# Patient Record
Sex: FEMALE | Race: WHITE
[De-identification: names, ages, dates, MRNs, and addresses within clinical notes are randomized per-mention and may not be internally consistent; named-entity substitution may affect disease eponyms.]

---

## 2021-06-26 ENCOUNTER — HOSPITAL ENCOUNTER (EMERGENCY)
Dept: HOSPITAL 54 - ER | Age: 86
Discharge: HOME | End: 2021-06-26
Payer: MEDICARE

## 2021-06-26 VITALS — BODY MASS INDEX: 31.34 KG/M2 | WEIGHT: 166 LBS | HEIGHT: 61 IN

## 2021-06-26 VITALS — DIASTOLIC BLOOD PRESSURE: 82 MMHG | SYSTOLIC BLOOD PRESSURE: 143 MMHG

## 2021-06-26 DIAGNOSIS — I10: ICD-10-CM
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
## 2021-06-26 DIAGNOSIS — S52.592A: Primary | ICD-10-CM

## 2021-06-26 DIAGNOSIS — E78.00: ICD-10-CM
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
## 2022-10-26 ENCOUNTER — HOSPITAL ENCOUNTER (INPATIENT)
Dept: HOSPITAL 12 - ER | Age: 87
LOS: 1 days | Discharge: NURSING, REHAB OR LONG-TERM CARE | DRG: 640 | End: 2022-10-27
Payer: MEDICARE

## 2022-10-26 VITALS — BODY MASS INDEX: 25.61 KG/M2 | WEIGHT: 150 LBS | HEIGHT: 64 IN

## 2022-10-26 VITALS — SYSTOLIC BLOOD PRESSURE: 177 MMHG | DIASTOLIC BLOOD PRESSURE: 78 MMHG

## 2022-10-26 DIAGNOSIS — R53.1: ICD-10-CM

## 2022-10-26 DIAGNOSIS — D64.9: ICD-10-CM

## 2022-10-26 DIAGNOSIS — E86.0: Primary | ICD-10-CM
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
## 2022-10-26 DIAGNOSIS — I10: ICD-10-CM

## 2022-10-26 DIAGNOSIS — E78.5: ICD-10-CM

## 2022-10-26 DIAGNOSIS — F03.90: ICD-10-CM
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
## 2022-10-26 DIAGNOSIS — Z20.822: ICD-10-CM

## 2022-10-26 DIAGNOSIS — N17.0: ICD-10-CM

## 2022-10-26 LAB
BUN SERPL-MCNC: 24 MG/DL (ref 7–18)
CHLORIDE SERPL-SCNC: 102 MMOL/L (ref 98–107)
CO2 SERPL-SCNC: 27 MMOL/L (ref 21–32)
CREAT SERPL-MCNC: 1.6 MG/DL (ref 0.6–1.3)
GLUCOSE SERPL-MCNC: 96 MG/DL (ref 74–106)
HCT VFR BLD AUTO: 32 % (ref 31.2–41.9)
MAGNESIUM SERPL-MCNC: 2.2 MG/DL (ref 1.8–2.4)
MCH RBC QN AUTO: 32.2 UUG (ref 24.7–32.8)
MCV RBC AUTO: 97 FL (ref 75.5–95.3)
PLATELET # BLD AUTO: 292 K/UL (ref 179–408)
POTASSIUM SERPL-SCNC: 4.9 MMOL/L (ref 3.5–5.1)

## 2022-10-26 PROCEDURE — G0378 HOSPITAL OBSERVATION PER HR: HCPCS

## 2022-10-26 NOTE — NUR
Admitted to Telemetry with diagnosis of Syncope. AAO x 3. On room air. Noted a red bump on 
the back of the head. Denies any pain or discomfort. Ambulatory. Skin is intact. IV site on 
L AC 20 g intact and patent. Routine admission done. All needs attended. Call light within 
reach. Left bed in the lowest position. Safety precautions observed.

-------------------------------------------------------------------------------

Addendum: 10/27/22 at 0607 by ANIL THORPE RN

-------------------------------------------------------------------------------

SR on Tele during admission changed to Af[facility]. MD aware.

## 2022-10-26 NOTE — NUR
Pt. admitted to TELE room 316  , under care of Cassidy NP

Belongs List completed

Philomena MEYRS aware of patient's arrival

## 2022-10-27 VITALS — DIASTOLIC BLOOD PRESSURE: 69 MMHG | SYSTOLIC BLOOD PRESSURE: 144 MMHG

## 2022-10-27 VITALS — SYSTOLIC BLOOD PRESSURE: 135 MMHG | DIASTOLIC BLOOD PRESSURE: 66 MMHG

## 2022-10-27 VITALS — DIASTOLIC BLOOD PRESSURE: 58 MMHG | SYSTOLIC BLOOD PRESSURE: 107 MMHG

## 2022-10-27 VITALS — SYSTOLIC BLOOD PRESSURE: 176 MMHG | DIASTOLIC BLOOD PRESSURE: 64 MMHG

## 2022-10-27 LAB
BUN SERPL-MCNC: 18 MG/DL (ref 7–18)
CHLORIDE SERPL-SCNC: 108 MMOL/L (ref 98–107)
CHOLEST SERPL-MCNC: 239 MG/DL (ref ?–200)
CO2 SERPL-SCNC: 28 MMOL/L (ref 21–32)
CREAT SERPL-MCNC: 1.3 MG/DL (ref 0.6–1.3)
GLUCOSE SERPL-MCNC: 99 MG/DL (ref 74–106)
HCT VFR BLD AUTO: 31.9 % (ref 31.2–41.9)
HDLC SERPL-MCNC: 42 MG/DL (ref 40–60)
MAGNESIUM SERPL-MCNC: 2.2 MG/DL (ref 1.8–2.4)
MCH RBC QN AUTO: 32.3 UUG (ref 24.7–32.8)
MCV RBC AUTO: 96.9 FL (ref 75.5–95.3)
PHOSPHATE SERPL-MCNC: 3.1 MG/DL (ref 2.5–4.9)
PLATELET # BLD AUTO: 166 K/UL (ref 179–408)
POTASSIUM SERPL-SCNC: 4.2 MMOL/L (ref 3.5–5.1)
TRIGL SERPL-MCNC: 179 MG/DL (ref 30–150)

## 2022-10-27 NOTE — NUR
PT IS DISCHARGE. PT IS GOING TO [facility]. PT IS AMBULATORY. NO SOB OR DIZZINESS 
NOTED. PT IS  BY [facility]IA GURNEY. ALL BELONGINGS ACCOUNTED FOR. IV ACCESS 
REMOVE.

## 2022-10-27 NOTE — NUR
PT WILL BE DISCHARGE TODAY. FAMILY NOTIFIED. SON ASK FOR INFO REGARDING SNF/correction PLACEMENT, 
REFER SON TO [facility]. WILL F/U

## 2023-06-17 ENCOUNTER — HOSPITAL ENCOUNTER (EMERGENCY)
Dept: HOSPITAL 12 - ER | Age: 88
Discharge: HOME | End: 2023-06-17
Payer: MEDICARE

## 2023-06-17 VITALS — WEIGHT: 125 LBS | HEIGHT: 63 IN | BODY MASS INDEX: 22.15 KG/M2

## 2023-06-17 VITALS — DIASTOLIC BLOOD PRESSURE: 72 MMHG | SYSTOLIC BLOOD PRESSURE: 136 MMHG

## 2023-06-17 DIAGNOSIS — M62.838: ICD-10-CM

## 2023-06-17 DIAGNOSIS — N39.0: ICD-10-CM

## 2023-06-17 DIAGNOSIS — R00.1: ICD-10-CM

## 2023-06-17 DIAGNOSIS — R53.1: Primary | ICD-10-CM

## 2023-06-17 DIAGNOSIS — Z79.899: ICD-10-CM

## 2023-06-17 DIAGNOSIS — R07.89: ICD-10-CM

## 2023-06-17 DIAGNOSIS — R51.9: ICD-10-CM

## 2023-06-17 LAB
ALP SERPL-CCNC: 143 U/L (ref 50–136)
ALT SERPL W/O P-5'-P-CCNC: 42 U/L (ref 14–59)
APPEARANCE UR: CLEAR
AST SERPL-CCNC: 22 U/L (ref 15–37)
BILIRUB DIRECT SERPL-MCNC: 0.1 MG/DL (ref 0–0.2)
BILIRUB SERPL-MCNC: 0.2 MG/DL (ref 0.2–1)
BILIRUB UR QL STRIP: NEGATIVE
BUN SERPL-MCNC: 26 MG/DL (ref 7–18)
CHLORIDE SERPL-SCNC: 103 MMOL/L (ref 98–107)
CO2 SERPL-SCNC: 29 MMOL/L (ref 21–32)
COLOR UR: YELLOW
CREAT SERPL-MCNC: 1.4 MG/DL (ref 0.6–1.3)
DEPRECATED SQUAMOUS URNS QL MICRO: (no result) /HPF
GLUCOSE SERPL-MCNC: 135 MG/DL (ref 74–106)
GLUCOSE UR STRIP-MCNC: NEGATIVE MG/DL
HCT VFR BLD AUTO: 31.3 % (ref 31.2–41.9)
HGB UR QL STRIP: NEGATIVE
KETONES UR STRIP-MCNC: NEGATIVE MG/DL
LEUKOCYTE ESTERASE UR QL STRIP: (no result)
MAGNESIUM SERPL-MCNC: 2.2 MG/DL (ref 1.8–2.4)
MCH RBC QN AUTO: 31.1 UUG (ref 24.7–32.8)
MCV RBC AUTO: 96.4 FL (ref 75.5–95.3)
NITRITE UR QL STRIP: NEGATIVE
PH UR STRIP: 5.5 [PH] (ref 5–8)
PLATELET # BLD AUTO: 269 K/UL (ref 179–408)
POTASSIUM SERPL-SCNC: 4.3 MMOL/L (ref 3.5–5.1)
RBC #/AREA URNS HPF: (no result) /HPF (ref 0–3)
SP GR UR STRIP: 1.01 (ref 1–1.03)
UROBILINOGEN UR STRIP-MCNC: 0.2 E.U./DL
WBC #/AREA URNS HPF: (no result) /HPF
WS STN SPEC: 7.1 G/DL (ref 6.4–8.2)

## 2023-06-17 PROCEDURE — 96360 HYDRATION IV INFUSION INIT: CPT

## 2023-06-17 PROCEDURE — 36415 COLL VENOUS BLD VENIPUNCTURE: CPT

## 2023-06-17 PROCEDURE — 82607 VITAMIN B-12: CPT

## 2023-06-17 PROCEDURE — 93005 ELECTROCARDIOGRAM TRACING: CPT

## 2023-06-17 PROCEDURE — 80076 HEPATIC FUNCTION PANEL: CPT

## 2023-06-17 PROCEDURE — 99285 EMERGENCY DEPT VISIT HI MDM: CPT

## 2023-06-17 PROCEDURE — 71045 X-RAY EXAM CHEST 1 VIEW: CPT

## 2023-06-17 PROCEDURE — 83735 ASSAY OF MAGNESIUM: CPT

## 2023-06-17 PROCEDURE — 85025 COMPLETE CBC W/AUTO DIFF WBC: CPT

## 2023-06-17 PROCEDURE — 81001 URINALYSIS AUTO W/SCOPE: CPT

## 2023-06-17 PROCEDURE — A4663 DIALYSIS BLOOD PRESSURE CUFF: HCPCS

## 2023-06-17 PROCEDURE — 70450 CT HEAD/BRAIN W/O DYE: CPT

## 2023-06-17 PROCEDURE — 84484 ASSAY OF TROPONIN QUANT: CPT

## 2023-06-17 PROCEDURE — 72125 CT NECK SPINE W/O DYE: CPT

## 2023-06-17 PROCEDURE — 80048 BASIC METABOLIC PNL TOTAL CA: CPT

## 2023-06-17 PROCEDURE — 96361 HYDRATE IV INFUSION ADD-ON: CPT

## 2023-06-17 NOTE — NUR
Pt is noted in bed responsive but forgetful due to  a history off Dementia as 
report is received from the off going nurse that was brought in by son C/O Left 
Neck/Trapezius pain that started couples hours ago with no specipic Trauma. Pt 
care continue as awaits results.

## 2023-06-17 NOTE — NUR
Pt is noted off the unit as she is been discharge to home with son by her side 
as all discharge instructions given .

## 2023-08-15 ENCOUNTER — HOSPITAL ENCOUNTER (INPATIENT)
Dept: HOSPITAL 12 - ER | Age: 88
LOS: 3 days | Discharge: NURSING, REHAB OR LONG-TERM CARE | DRG: 73 | End: 2023-08-18
Attending: INTERNAL MEDICINE | Admitting: INTERNAL MEDICINE
Payer: MEDICARE

## 2023-08-15 VITALS — OXYGEN SATURATION: 95 % | SYSTOLIC BLOOD PRESSURE: 135 MMHG | DIASTOLIC BLOOD PRESSURE: 69 MMHG | TEMPERATURE: 98.5 F

## 2023-08-15 VITALS — OXYGEN SATURATION: 95 % | TEMPERATURE: 98.6 F | DIASTOLIC BLOOD PRESSURE: 59 MMHG | SYSTOLIC BLOOD PRESSURE: 118 MMHG

## 2023-08-15 VITALS — SYSTOLIC BLOOD PRESSURE: 164 MMHG | OXYGEN SATURATION: 99 % | TEMPERATURE: 99 F | DIASTOLIC BLOOD PRESSURE: 69 MMHG

## 2023-08-15 VITALS — SYSTOLIC BLOOD PRESSURE: 118 MMHG | TEMPERATURE: 98.6 F | OXYGEN SATURATION: 95 % | DIASTOLIC BLOOD PRESSURE: 59 MMHG

## 2023-08-15 VITALS — WEIGHT: 150.56 LBS | BODY MASS INDEX: 25.7 KG/M2 | HEIGHT: 64 IN

## 2023-08-15 DIAGNOSIS — K86.89: ICD-10-CM

## 2023-08-15 DIAGNOSIS — D68.59: ICD-10-CM

## 2023-08-15 DIAGNOSIS — Z87.891: ICD-10-CM

## 2023-08-15 DIAGNOSIS — N39.0: ICD-10-CM

## 2023-08-15 DIAGNOSIS — Z98.42: ICD-10-CM

## 2023-08-15 DIAGNOSIS — M89.8X9: ICD-10-CM

## 2023-08-15 DIAGNOSIS — I13.10: ICD-10-CM

## 2023-08-15 DIAGNOSIS — N83.202: ICD-10-CM

## 2023-08-15 DIAGNOSIS — D64.9: ICD-10-CM

## 2023-08-15 DIAGNOSIS — S01.312A: ICD-10-CM

## 2023-08-15 DIAGNOSIS — Z82.49: ICD-10-CM

## 2023-08-15 DIAGNOSIS — J44.9: ICD-10-CM

## 2023-08-15 DIAGNOSIS — F32.A: ICD-10-CM

## 2023-08-15 DIAGNOSIS — M50.30: ICD-10-CM

## 2023-08-15 DIAGNOSIS — Z74.09: ICD-10-CM

## 2023-08-15 DIAGNOSIS — E86.0: ICD-10-CM

## 2023-08-15 DIAGNOSIS — Z98.41: ICD-10-CM

## 2023-08-15 DIAGNOSIS — N17.0: ICD-10-CM

## 2023-08-15 DIAGNOSIS — K44.9: ICD-10-CM

## 2023-08-15 DIAGNOSIS — D75.89: ICD-10-CM

## 2023-08-15 DIAGNOSIS — K57.90: ICD-10-CM

## 2023-08-15 DIAGNOSIS — I25.10: ICD-10-CM

## 2023-08-15 DIAGNOSIS — I35.8: ICD-10-CM
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
## 2023-08-15 DIAGNOSIS — Z95.5: ICD-10-CM

## 2023-08-15 DIAGNOSIS — M10.9: ICD-10-CM

## 2023-08-15 DIAGNOSIS — Z90.49: ICD-10-CM

## 2023-08-15 DIAGNOSIS — Z90.710: ICD-10-CM

## 2023-08-15 DIAGNOSIS — N18.2: ICD-10-CM

## 2023-08-15 DIAGNOSIS — S20.211A: ICD-10-CM

## 2023-08-15 DIAGNOSIS — I70.0: ICD-10-CM

## 2023-08-15 DIAGNOSIS — R29.6: ICD-10-CM

## 2023-08-15 DIAGNOSIS — F01.53: ICD-10-CM

## 2023-08-15 DIAGNOSIS — Z79.899: ICD-10-CM

## 2023-08-15 DIAGNOSIS — I49.3: ICD-10-CM
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
## 2023-08-15 DIAGNOSIS — S09.90XA: ICD-10-CM

## 2023-08-15 DIAGNOSIS — G90.8: Primary | ICD-10-CM

## 2023-08-15 DIAGNOSIS — I70.8: ICD-10-CM

## 2023-08-15 LAB
ALBUMIN SERPL BCG-MCNC: 3.2 G/DL (ref 3.4–5)
ALP SERPL-CCNC: 73 U/L (ref 50–136)
ALT SERPL W/O P-5'-P-CCNC: 16 U/L (ref 14–59)
APPEARANCE UR: CLEAR
AST SERPL-CCNC: 14 U/L (ref 15–37)
BASOPHILS # BLD AUTO: 0.1 K/UL (ref 0–0.2)
BASOPHILS NFR BLD AUTO: 0.4 % (ref 0–2)
BILIRUB DIRECT SERPL-MCNC: 0.1 MG/DL (ref 0–0.2)
BILIRUB SERPL-MCNC: 0.4 MG/DL (ref 0.2–1)
BILIRUB UR QL STRIP: NEGATIVE
BUN SERPL-MCNC: 39 MG/DL (ref 7–18)
CALCIUM SERPL-MCNC: 9.1 MG/DL (ref 8.5–10.1)
CHLORIDE SERPL-SCNC: 104 MMOL/L (ref 98–107)
CO2 SERPL-SCNC: 28 MMOL/L (ref 21–32)
COLOR UR: YELLOW
CREAT SERPL-MCNC: 1.8 MG/DL (ref 0.6–1.3)
DEPRECATED SQUAMOUS URNS QL MICRO: (no result) /HPF
EOSINOPHIL # BLD AUTO: 0.1 K/UL (ref 0–0.7)
EOSINOPHIL NFR BLD AUTO: 0.5 % (ref 0–7)
ERYTHROCYTE [DISTWIDTH] IN BLOOD BY AUTOMATED COUNT: 15.9 % (ref 12.3–17.7)
GLUCOSE SERPL-MCNC: 118 MG/DL (ref 74–106)
GLUCOSE UR STRIP-MCNC: NEGATIVE MG/DL
HCT VFR BLD AUTO: 30 % (ref 31.2–41.9)
HGB BLD-MCNC: 9.6 G/DL (ref 10.9–14.3)
HGB UR QL STRIP: NEGATIVE
KETONES UR STRIP-MCNC: NEGATIVE MG/DL
LEUKOCYTE ESTERASE UR QL STRIP: (no result)
LYMPHOCYTES # BLD AUTO: 1.5 K/UL (ref 0.8–4.8)
LYMPHOCYTES NFR BLD AUTO: 11.8 % (ref 20.5–51.5)
MANUAL DIF COMMENT BLD-IMP: 1
MCH RBC QN AUTO: 30.9 UUG (ref 24.7–32.8)
MCHC RBC AUTO-ENTMCNC: 32 G/DL (ref 32.3–35.6)
MCV RBC AUTO: 96.1 FL (ref 75.5–95.3)
MONOCYTES # BLD AUTO: 0.7 K/UL (ref 0.1–1.3)
MONOCYTES NFR BLD AUTO: 5.3 % (ref 0–11)
NEUTROPHILS # BLD AUTO: 10.2 K/UL (ref 1.8–8.9)
NEUTROPHILS NFR BLD AUTO: 82 % (ref 38.5–71.5)
NITRITE UR QL STRIP: NEGATIVE
NT-PROBNP SERPL-MCNC: 2114 PG/ML (ref 0–125)
PH UR STRIP: 7 [PH] (ref 5–8)
PLATELET # BLD AUTO: 264 K/UL (ref 179–408)
POTASSIUM SERPL-SCNC: 4.6 MMOL/L (ref 3.5–5.1)
PROT UR QL STRIP: NEGATIVE
RBC # BLD AUTO: 3.12 MIL/UL (ref 3.63–4.92)
RBC #/AREA URNS HPF: (no result) /HPF (ref 0–3)
SODIUM SERPL-SCNC: 140 MMOL/L (ref 136–145)
SP GR UR STRIP: 1.01 (ref 1–1.03)
UROBILINOGEN UR STRIP-MCNC: 0.2 E.U./DL
WBC # BLD AUTO: 12.4 K/UL (ref 3.8–11.8)
WBC #/AREA URNS HPF: (no result) /HPF
WBC #/AREA URNS HPF: (no result) /HPF (ref 0–3)
WS STN SPEC: 7.4 G/DL (ref 6.4–8.2)

## 2023-08-15 PROCEDURE — A4663 DIALYSIS BLOOD PRESSURE CUFF: HCPCS

## 2023-08-15 PROCEDURE — G0378 HOSPITAL OBSERVATION PER HR: HCPCS

## 2023-08-15 PROCEDURE — 0HQ3XZZ REPAIR LEFT EAR SKIN, EXTERNAL APPROACH: ICD-10-PCS

## 2023-08-15 RX ADMIN — METOPROLOL SUCCINATE SCH MG: 50 TABLET, FILM COATED, EXTENDED RELEASE ORAL at 14:27

## 2023-08-15 RX ADMIN — Medication SCH MG: at 14:29

## 2023-08-15 RX ADMIN — ALLOPURINOL SCH MG: 100 TABLET ORAL at 14:30

## 2023-08-15 RX ADMIN — Medication PRN MG: at 22:52

## 2023-08-15 RX ADMIN — DOCUSATE SODIUM SCH MG: 100 CAPSULE, LIQUID FILLED ORAL at 20:17

## 2023-08-15 RX ADMIN — METOPROLOL SUCCINATE SCH MG: 50 TABLET, FILM COATED, EXTENDED RELEASE ORAL at 14:30

## 2023-08-15 RX ADMIN — Medication SCH MG: at 14:26

## 2023-08-15 RX ADMIN — ARIPIPRAZOLE SCH MG: 2 TABLET ORAL at 15:33

## 2023-08-15 RX ADMIN — DEXTROSE SCH MLS/HR: 50 INJECTION, SOLUTION INTRAVENOUS at 22:08

## 2023-08-15 RX ADMIN — ATORVASTATIN CALCIUM SCH MG: 20 TABLET, FILM COATED ORAL at 20:17

## 2023-08-15 RX ADMIN — ALLOPURINOL SCH MG: 100 TABLET ORAL at 14:28

## 2023-08-16 VITALS — DIASTOLIC BLOOD PRESSURE: 69 MMHG | TEMPERATURE: 97.8 F | SYSTOLIC BLOOD PRESSURE: 137 MMHG | OXYGEN SATURATION: 98 %

## 2023-08-16 VITALS — OXYGEN SATURATION: 97 % | DIASTOLIC BLOOD PRESSURE: 68 MMHG | SYSTOLIC BLOOD PRESSURE: 152 MMHG | TEMPERATURE: 98.9 F

## 2023-08-16 VITALS — SYSTOLIC BLOOD PRESSURE: 145 MMHG | DIASTOLIC BLOOD PRESSURE: 58 MMHG | OXYGEN SATURATION: 97 % | TEMPERATURE: 97.8 F

## 2023-08-16 VITALS — OXYGEN SATURATION: 95 % | SYSTOLIC BLOOD PRESSURE: 158 MMHG | DIASTOLIC BLOOD PRESSURE: 56 MMHG | TEMPERATURE: 98.4 F

## 2023-08-16 VITALS — TEMPERATURE: 97.8 F | OXYGEN SATURATION: 100 % | SYSTOLIC BLOOD PRESSURE: 144 MMHG | DIASTOLIC BLOOD PRESSURE: 55 MMHG

## 2023-08-16 VITALS — TEMPERATURE: 98.1 F | OXYGEN SATURATION: 97 % | DIASTOLIC BLOOD PRESSURE: 55 MMHG | SYSTOLIC BLOOD PRESSURE: 152 MMHG

## 2023-08-16 VITALS — OXYGEN SATURATION: 96 % | TEMPERATURE: 98.8 F | DIASTOLIC BLOOD PRESSURE: 72 MMHG | SYSTOLIC BLOOD PRESSURE: 145 MMHG

## 2023-08-16 LAB
ALBUMIN SERPL BCG-MCNC: 2.9 G/DL (ref 3.4–5)
ALP SERPL-CCNC: 66 U/L (ref 50–136)
ALT SERPL W/O P-5'-P-CCNC: 10 U/L (ref 14–59)
AST SERPL-CCNC: 15 U/L (ref 15–37)
BASOPHILS # BLD AUTO: 0 K/UL (ref 0–0.2)
BASOPHILS NFR BLD AUTO: 0.3 % (ref 0–2)
BILIRUB SERPL-MCNC: 0.4 MG/DL (ref 0.2–1)
BUN SERPL-MCNC: 33 MG/DL (ref 7–18)
CALCIUM SERPL-MCNC: 9.2 MG/DL (ref 8.5–10.1)
CHLORIDE SERPL-SCNC: 105 MMOL/L (ref 98–107)
CHOLEST SERPL-MCNC: 193 MG/DL (ref ?–200)
CK SERPL-CCNC: 77 U/L (ref 26–192)
CO2 SERPL-SCNC: 29 MMOL/L (ref 21–32)
CREAT SERPL-MCNC: 1.5 MG/DL (ref 0.6–1.3)
EOSINOPHIL # BLD AUTO: 0.2 K/UL (ref 0–0.7)
EOSINOPHIL NFR BLD AUTO: 1.9 % (ref 0–7)
ERYTHROCYTE [DISTWIDTH] IN BLOOD BY AUTOMATED COUNT: 15.6 % (ref 12.3–17.7)
GLUCOSE SERPL-MCNC: 99 MG/DL (ref 74–106)
HCT VFR BLD AUTO: 28.9 % (ref 31.2–41.9)
HDLC SERPL-MCNC: 38 MG/DL (ref 40–60)
HGB BLD-MCNC: 9.5 G/DL (ref 10.9–14.3)
IRON SERPL-MCNC: 34 UG/DL (ref 50–175)
LYMPHOCYTES # BLD AUTO: 2.7 K/UL (ref 0.8–4.8)
LYMPHOCYTES NFR BLD AUTO: 23.8 % (ref 20.5–51.5)
MAGNESIUM SERPL-MCNC: 2.5 MG/DL (ref 1.8–2.4)
MANUAL DIF COMMENT BLD-IMP: 1
MCH RBC QN AUTO: 31.3 UUG (ref 24.7–32.8)
MCHC RBC AUTO-ENTMCNC: 33 G/DL (ref 32.3–35.6)
MCV RBC AUTO: 95.1 FL (ref 75.5–95.3)
MONOCYTES # BLD AUTO: 0.7 K/UL (ref 0.1–1.3)
MONOCYTES NFR BLD AUTO: 6.5 % (ref 0–11)
NEUTROPHILS # BLD AUTO: 7.6 K/UL (ref 1.8–8.9)
NEUTROPHILS NFR BLD AUTO: 67.5 % (ref 38.5–71.5)
PHOSPHATE SERPL-MCNC: 3.1 MG/DL (ref 2.5–4.9)
PLATELET # BLD AUTO: 237 K/UL (ref 179–408)
POTASSIUM SERPL-SCNC: 4.4 MMOL/L (ref 3.5–5.1)
RBC # BLD AUTO: 3.04 MIL/UL (ref 3.63–4.92)
SODIUM SERPL-SCNC: 140 MMOL/L (ref 136–145)
TRIGL SERPL-MCNC: 107 MG/DL (ref 30–150)
TSH SERPL DL<=0.005 MIU/L-ACNC: 1.28 MIU/ML (ref 0.36–3.74)
WBC # BLD AUTO: 11.2 K/UL (ref 3.8–11.8)
WS STN SPEC: 6.6 G/DL (ref 6.4–8.2)

## 2023-08-16 RX ADMIN — Medication SCH MG: at 08:58

## 2023-08-16 RX ADMIN — ATORVASTATIN CALCIUM SCH MG: 20 TABLET, FILM COATED ORAL at 21:07

## 2023-08-16 RX ADMIN — DOCUSATE SODIUM SCH MG: 100 CAPSULE, LIQUID FILLED ORAL at 21:07

## 2023-08-16 RX ADMIN — Medication PRN MG: at 21:07

## 2023-08-16 RX ADMIN — METOPROLOL SUCCINATE SCH MG: 50 TABLET, FILM COATED, EXTENDED RELEASE ORAL at 08:59

## 2023-08-16 RX ADMIN — ALLOPURINOL SCH MG: 100 TABLET ORAL at 08:58

## 2023-08-16 RX ADMIN — ARIPIPRAZOLE SCH MG: 2 TABLET ORAL at 08:58

## 2023-08-16 RX ADMIN — DEXTROSE SCH MLS/HR: 50 INJECTION, SOLUTION INTRAVENOUS at 21:07

## 2023-08-16 RX ADMIN — ASPIRIN SCH MG: 81 TABLET, COATED ORAL at 08:59

## 2023-08-16 RX ADMIN — PANTOPRAZOLE SODIUM SCH MG: 40 TABLET, DELAYED RELEASE ORAL at 06:16

## 2023-08-16 RX ADMIN — PANTOPRAZOLE SODIUM SCH MG: 40 TABLET, DELAYED RELEASE ORAL at 06:30

## 2023-08-17 VITALS — OXYGEN SATURATION: 96 % | DIASTOLIC BLOOD PRESSURE: 50 MMHG | SYSTOLIC BLOOD PRESSURE: 134 MMHG | TEMPERATURE: 98 F

## 2023-08-17 VITALS — SYSTOLIC BLOOD PRESSURE: 126 MMHG | TEMPERATURE: 98.2 F | DIASTOLIC BLOOD PRESSURE: 54 MMHG | OXYGEN SATURATION: 95 %

## 2023-08-17 VITALS — TEMPERATURE: 97.8 F | SYSTOLIC BLOOD PRESSURE: 131 MMHG | OXYGEN SATURATION: 96 % | DIASTOLIC BLOOD PRESSURE: 52 MMHG

## 2023-08-17 VITALS — SYSTOLIC BLOOD PRESSURE: 136 MMHG | DIASTOLIC BLOOD PRESSURE: 62 MMHG | TEMPERATURE: 97.9 F | OXYGEN SATURATION: 94 %

## 2023-08-17 VITALS — TEMPERATURE: 98.4 F | DIASTOLIC BLOOD PRESSURE: 53 MMHG | SYSTOLIC BLOOD PRESSURE: 125 MMHG | OXYGEN SATURATION: 94 %

## 2023-08-17 LAB
ALBUMIN SERPL BCG-MCNC: 2.7 G/DL (ref 3.4–5)
ALP SERPL-CCNC: 70 U/L (ref 50–136)
ALT SERPL W/O P-5'-P-CCNC: 8 U/L (ref 14–59)
APPEARANCE UR: CLEAR
AST SERPL-CCNC: 9 U/L (ref 15–37)
BASOPHILS # BLD AUTO: 0 K/UL (ref 0–0.2)
BASOPHILS NFR BLD AUTO: 0.3 % (ref 0–2)
BILIRUB SERPL-MCNC: 0.3 MG/DL (ref 0.2–1)
BILIRUB UR QL STRIP: NEGATIVE
BUN SERPL-MCNC: 20 MG/DL (ref 7–18)
CALCIUM SERPL-MCNC: 8.4 MG/DL (ref 8.5–10.1)
CHLORIDE SERPL-SCNC: 105 MMOL/L (ref 98–107)
CO2 SERPL-SCNC: 24 MMOL/L (ref 21–32)
COLOR UR: YELLOW
CREAT SERPL-MCNC: 1.4 MG/DL (ref 0.6–1.3)
CREAT UR-MCNC: 62.3 MG/DL (ref 30–125)
DEPRECATED SQUAMOUS URNS QL MICRO: (no result) /HPF
EOSINOPHIL # BLD AUTO: 0.2 K/UL (ref 0–0.7)
EOSINOPHIL NFR BLD AUTO: 1.6 % (ref 0–7)
ERYTHROCYTE [DISTWIDTH] IN BLOOD BY AUTOMATED COUNT: 15.3 % (ref 12.3–17.7)
GLUCOSE SERPL-MCNC: 108 MG/DL (ref 74–106)
GLUCOSE UR STRIP-MCNC: NEGATIVE MG/DL
HCT VFR BLD AUTO: 28.8 % (ref 31.2–41.9)
HGB BLD-MCNC: 9.4 G/DL (ref 10.9–14.3)
HGB UR QL STRIP: NEGATIVE
KETONES UR STRIP-MCNC: NEGATIVE MG/DL
LEUKOCYTE ESTERASE UR QL STRIP: (no result)
LYMPHOCYTES # BLD AUTO: 2.4 K/UL (ref 0.8–4.8)
LYMPHOCYTES NFR BLD AUTO: 17.4 % (ref 20.5–51.5)
MAGNESIUM SERPL-MCNC: 2.2 MG/DL (ref 1.8–2.4)
MANUAL DIF COMMENT BLD-IMP: 1
MCH RBC QN AUTO: 31.2 UUG (ref 24.7–32.8)
MCHC RBC AUTO-ENTMCNC: 33 G/DL (ref 32.3–35.6)
MCV RBC AUTO: 95.8 FL (ref 75.5–95.3)
MONOCYTES # BLD AUTO: 0.9 K/UL (ref 0.1–1.3)
MONOCYTES NFR BLD AUTO: 6.6 % (ref 0–11)
NEUTROPHILS # BLD AUTO: 10.1 K/UL (ref 1.8–8.9)
NEUTROPHILS NFR BLD AUTO: 74.1 % (ref 38.5–71.5)
NITRITE UR QL STRIP: NEGATIVE
PH UR STRIP: 6 [PH] (ref 5–8)
PHOSPHATE SERPL-MCNC: 2.7 MG/DL (ref 2.5–4.9)
PLATELET # BLD AUTO: 221 K/UL (ref 179–408)
POTASSIUM SERPL-SCNC: 4.1 MMOL/L (ref 3.5–5.1)
PROT UR QL STRIP: NEGATIVE
PROT UR-MCNC: 14.5 MG/DL
RBC # BLD AUTO: 3.01 MIL/UL (ref 3.63–4.92)
SODIUM SERPL-SCNC: 137 MMOL/L (ref 136–145)
SODIUM UR-SCNC: 102 MMOL/L (ref 40–220)
SP GR UR STRIP: 1.01 (ref 1–1.03)
UROBILINOGEN UR STRIP-MCNC: 0.2 E.U./DL
WBC # BLD AUTO: 13.6 K/UL (ref 3.8–11.8)
WBC #/AREA URNS HPF: (no result) /HPF
WBC #/AREA URNS HPF: (no result) /HPF (ref 0–3)
WS STN SPEC: 6.7 G/DL (ref 6.4–8.2)

## 2023-08-17 RX ADMIN — PANTOPRAZOLE SODIUM SCH MG: 40 TABLET, DELAYED RELEASE ORAL at 06:05

## 2023-08-17 RX ADMIN — ARIPIPRAZOLE SCH MG: 2 TABLET ORAL at 08:42

## 2023-08-17 RX ADMIN — Medication SCH MG: at 08:41

## 2023-08-17 RX ADMIN — ACETAMINOPHEN PRN MG: 325 TABLET ORAL at 12:52

## 2023-08-17 RX ADMIN — DEXTROSE SCH MLS/HR: 50 INJECTION, SOLUTION INTRAVENOUS at 21:46

## 2023-08-17 RX ADMIN — DOCUSATE SODIUM SCH MG: 100 CAPSULE, LIQUID FILLED ORAL at 21:46

## 2023-08-17 RX ADMIN — ATORVASTATIN CALCIUM SCH MG: 20 TABLET, FILM COATED ORAL at 21:45

## 2023-08-17 RX ADMIN — METOPROLOL SUCCINATE SCH MG: 50 TABLET, FILM COATED, EXTENDED RELEASE ORAL at 08:41

## 2023-08-17 RX ADMIN — ALLOPURINOL SCH MG: 100 TABLET ORAL at 08:41

## 2023-08-17 RX ADMIN — ASPIRIN SCH MG: 81 TABLET, COATED ORAL at 08:41

## 2023-08-18 VITALS — DIASTOLIC BLOOD PRESSURE: 61 MMHG | TEMPERATURE: 98.2 F | OXYGEN SATURATION: 96 % | SYSTOLIC BLOOD PRESSURE: 150 MMHG

## 2023-08-18 VITALS — DIASTOLIC BLOOD PRESSURE: 82 MMHG | SYSTOLIC BLOOD PRESSURE: 191 MMHG | OXYGEN SATURATION: 96 % | TEMPERATURE: 95 F

## 2023-08-18 VITALS — OXYGEN SATURATION: 98 %

## 2023-08-18 VITALS — SYSTOLIC BLOOD PRESSURE: 150 MMHG | TEMPERATURE: 98 F | DIASTOLIC BLOOD PRESSURE: 62 MMHG | OXYGEN SATURATION: 99 %

## 2023-08-18 VITALS — DIASTOLIC BLOOD PRESSURE: 43 MMHG | SYSTOLIC BLOOD PRESSURE: 129 MMHG

## 2023-08-18 LAB
BASOPHILS # BLD AUTO: 0.1 K/UL (ref 0–0.2)
BASOPHILS NFR BLD AUTO: 0.9 % (ref 0–2)
BUN SERPL-MCNC: 19 MG/DL (ref 7–18)
CALCIUM SERPL-MCNC: 8.7 MG/DL (ref 8.5–10.1)
CHLORIDE SERPL-SCNC: 105 MMOL/L (ref 98–107)
CO2 SERPL-SCNC: 26 MMOL/L (ref 21–32)
CREAT SERPL-MCNC: 1.6 MG/DL (ref 0.6–1.3)
EOSINOPHIL # BLD AUTO: 0.3 K/UL (ref 0–0.7)
EOSINOPHIL NFR BLD AUTO: 2.9 % (ref 0–7)
ERYTHROCYTE [DISTWIDTH] IN BLOOD BY AUTOMATED COUNT: 15.6 % (ref 12.3–17.7)
GLUCOSE SERPL-MCNC: 96 MG/DL (ref 74–106)
HCT VFR BLD AUTO: 29.3 % (ref 31.2–41.9)
HGB BLD-MCNC: 9.6 G/DL (ref 10.9–14.3)
LYMPHOCYTES # BLD AUTO: 2.8 K/UL (ref 0.8–4.8)
LYMPHOCYTES NFR BLD AUTO: 26.6 % (ref 20.5–51.5)
MAGNESIUM SERPL-MCNC: 2.1 MG/DL (ref 1.8–2.4)
MANUAL DIF COMMENT BLD-IMP: 1
MCH RBC QN AUTO: 31.4 UUG (ref 24.7–32.8)
MCHC RBC AUTO-ENTMCNC: 33 G/DL (ref 32.3–35.6)
MCV RBC AUTO: 96 FL (ref 75.5–95.3)
MONOCYTES # BLD AUTO: 0.6 K/UL (ref 0.1–1.3)
MONOCYTES NFR BLD AUTO: 6 % (ref 0–11)
NEUTROPHILS # BLD AUTO: 6.8 K/UL (ref 1.8–8.9)
NEUTROPHILS NFR BLD AUTO: 63.6 % (ref 38.5–71.5)
PHOSPHATE SERPL-MCNC: 3.1 MG/DL (ref 2.5–4.9)
PLATELET # BLD AUTO: 264 K/UL (ref 179–408)
POTASSIUM SERPL-SCNC: 3.5 MMOL/L (ref 3.5–5.1)
RBC # BLD AUTO: 3.05 MIL/UL (ref 3.63–4.92)
SODIUM SERPL-SCNC: 139 MMOL/L (ref 136–145)
WBC # BLD AUTO: 10.7 K/UL (ref 3.8–11.8)

## 2023-08-18 RX ADMIN — ASPIRIN SCH MG: 81 TABLET, COATED ORAL at 08:51

## 2023-08-18 RX ADMIN — ALLOPURINOL SCH MG: 100 TABLET ORAL at 08:50

## 2023-08-18 RX ADMIN — METOPROLOL SUCCINATE SCH MG: 50 TABLET, FILM COATED, EXTENDED RELEASE ORAL at 08:51

## 2023-08-18 RX ADMIN — ARIPIPRAZOLE SCH MG: 2 TABLET ORAL at 08:50

## 2023-08-18 RX ADMIN — Medication SCH MG: at 08:50

## 2023-08-18 RX ADMIN — PANTOPRAZOLE SODIUM SCH MG: 40 TABLET, DELAYED RELEASE ORAL at 06:51

## 2023-08-18 RX ADMIN — ACETAMINOPHEN PRN MG: 325 TABLET ORAL at 10:58

## 2023-09-05 ENCOUNTER — HOSPITAL ENCOUNTER (EMERGENCY)
Dept: HOSPITAL 54 - ER | Age: 88
Discharge: HOME | End: 2023-09-05
Payer: MEDICARE

## 2023-09-05 VITALS — OXYGEN SATURATION: 96 % | SYSTOLIC BLOOD PRESSURE: 130 MMHG | TEMPERATURE: 98.7 F | DIASTOLIC BLOOD PRESSURE: 60 MMHG

## 2023-09-05 VITALS — WEIGHT: 151 LBS | HEIGHT: 61 IN | BODY MASS INDEX: 28.51 KG/M2

## 2023-09-05 DIAGNOSIS — R00.2: Primary | ICD-10-CM

## 2023-09-05 DIAGNOSIS — E78.00: ICD-10-CM

## 2023-09-05 DIAGNOSIS — K21.9: ICD-10-CM

## 2023-09-05 DIAGNOSIS — Z79.899: ICD-10-CM

## 2023-09-05 DIAGNOSIS — I10: ICD-10-CM

## 2023-09-05 LAB
BASOPHILS # BLD AUTO: 0 K/UL (ref 0–0.2)
BASOPHILS NFR BLD AUTO: 0.3 % (ref 0–2)
BUN SERPL-MCNC: 25 MG/DL (ref 7–18)
CALCIUM SERPL-MCNC: 9.7 MG/DL (ref 8.5–10.1)
CHLORIDE SERPL-SCNC: 101 MMOL/L (ref 98–107)
CO2 SERPL-SCNC: 21 MMOL/L (ref 21–32)
CREAT SERPL-MCNC: 2.1 MG/DL (ref 0.6–1.3)
EOSINOPHIL # BLD AUTO: 0 K/UL (ref 0–0.7)
EOSINOPHIL NFR BLD AUTO: 0.1 % (ref 0–6)
ERYTHROCYTE [DISTWIDTH] IN BLOOD BY AUTOMATED COUNT: 16.1 % (ref 11.5–15)
GLUCOSE SERPL-MCNC: 186 MG/DL (ref 74–106)
HCT VFR BLD AUTO: 30 % (ref 33–45)
HGB BLD-MCNC: 9.5 G/DL (ref 11.5–14.8)
LYMPHOCYTES NFR BLD AUTO: 1.9 K/UL (ref 0.8–4.8)
LYMPHOCYTES NFR BLD AUTO: 16 % (ref 20–44)
MAGNESIUM SERPL-MCNC: 2.2 MG/DL (ref 1.8–2.4)
MCH RBC QN AUTO: 31 PG (ref 26–33)
MCHC RBC AUTO-ENTMCNC: 32 G/DL (ref 31–36)
MCV RBC AUTO: 96 FL (ref 82–100)
MONOCYTES NFR BLD AUTO: 0.2 K/UL (ref 0.1–1.3)
MONOCYTES NFR BLD AUTO: 2 % (ref 2–12)
NEUTROPHILS # BLD AUTO: 9.5 K/UL (ref 1.8–8.9)
NEUTROPHILS NFR BLD AUTO: 81.6 % (ref 43–81)
NT-PROBNP SERPL-MCNC: 1427 PG/ML (ref 0–125)
PLATELET # BLD AUTO: 341 K/UL (ref 150–450)
POTASSIUM SERPL-SCNC: 4 MMOL/L (ref 3.5–5.1)
RBC # BLD AUTO: 3.08 MIL/UL (ref 4–5.2)
SODIUM SERPL-SCNC: 135 MMOL/L (ref 136–145)
TSH SERPL DL<=0.005 MIU/L-ACNC: 1.05 UIU/ML (ref 0.36–3.74)
WBC NRBC COR # BLD AUTO: 11.7 K/UL (ref 4.3–11)

## 2023-09-05 PROCEDURE — 83735 ASSAY OF MAGNESIUM: CPT

## 2023-09-05 PROCEDURE — 84439 ASSAY OF FREE THYROXINE: CPT

## 2023-09-05 PROCEDURE — 99285 EMERGENCY DEPT VISIT HI MDM: CPT

## 2023-09-05 PROCEDURE — 71045 X-RAY EXAM CHEST 1 VIEW: CPT

## 2023-09-05 PROCEDURE — 84443 ASSAY THYROID STIM HORMONE: CPT

## 2023-09-05 PROCEDURE — 93005 ELECTROCARDIOGRAM TRACING: CPT

## 2023-09-05 PROCEDURE — 83880 ASSAY OF NATRIURETIC PEPTIDE: CPT

## 2023-09-05 PROCEDURE — 96360 HYDRATION IV INFUSION INIT: CPT

## 2023-09-05 PROCEDURE — 84484 ASSAY OF TROPONIN QUANT: CPT

## 2023-09-05 PROCEDURE — 85025 COMPLETE CBC W/AUTO DIFF WBC: CPT

## 2023-09-05 PROCEDURE — 36415 COLL VENOUS BLD VENIPUNCTURE: CPT

## 2023-09-05 PROCEDURE — 80048 BASIC METABOLIC PNL TOTAL CA: CPT

## 2023-12-11 ENCOUNTER — HOSPITAL ENCOUNTER (INPATIENT)
Dept: HOSPITAL 54 - ER | Age: 88
LOS: 3 days | Discharge: NURSING, REHAB OR LONG-TERM CARE | DRG: 556 | End: 2023-12-14
Attending: INTERNAL MEDICINE | Admitting: INTERNAL MEDICINE
Payer: MEDICARE

## 2023-12-11 VITALS — TEMPERATURE: 98.8 F | OXYGEN SATURATION: 95 % | SYSTOLIC BLOOD PRESSURE: 123 MMHG | DIASTOLIC BLOOD PRESSURE: 93 MMHG

## 2023-12-11 VITALS — DIASTOLIC BLOOD PRESSURE: 96 MMHG | TEMPERATURE: 98.4 F | SYSTOLIC BLOOD PRESSURE: 151 MMHG | OXYGEN SATURATION: 97 %

## 2023-12-11 VITALS — DIASTOLIC BLOOD PRESSURE: 61 MMHG | OXYGEN SATURATION: 97 % | SYSTOLIC BLOOD PRESSURE: 159 MMHG | TEMPERATURE: 98.6 F

## 2023-12-11 VITALS — WEIGHT: 150 LBS | HEIGHT: 64 IN | BODY MASS INDEX: 25.61 KG/M2
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
## 2023-12-11 DIAGNOSIS — M25.551: Primary | ICD-10-CM

## 2023-12-11 DIAGNOSIS — Z90.49: ICD-10-CM

## 2023-12-11 DIAGNOSIS — F03.93: ICD-10-CM

## 2023-12-11 DIAGNOSIS — N83.202: ICD-10-CM
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
## 2023-12-11 DIAGNOSIS — N18.9: ICD-10-CM

## 2023-12-11 DIAGNOSIS — M10.9: ICD-10-CM

## 2023-12-11 DIAGNOSIS — F17.200: ICD-10-CM

## 2023-12-11 DIAGNOSIS — F32.A: ICD-10-CM

## 2023-12-11 DIAGNOSIS — R29.6: ICD-10-CM

## 2023-12-11 DIAGNOSIS — I12.9: ICD-10-CM

## 2023-12-11 DIAGNOSIS — Z53.20: ICD-10-CM

## 2023-12-11 DIAGNOSIS — E87.1: ICD-10-CM

## 2023-12-11 DIAGNOSIS — I10: ICD-10-CM

## 2023-12-11 DIAGNOSIS — K21.9: ICD-10-CM

## 2023-12-11 DIAGNOSIS — Z79.899: ICD-10-CM

## 2023-12-11 DIAGNOSIS — E78.00: ICD-10-CM

## 2023-12-11 DIAGNOSIS — E66.9: ICD-10-CM

## 2023-12-11 DIAGNOSIS — K57.30: ICD-10-CM

## 2023-12-11 DIAGNOSIS — E88.9: ICD-10-CM

## 2023-12-11 DIAGNOSIS — D64.9: ICD-10-CM

## 2023-12-11 LAB
ALBUMIN SERPL BCP-MCNC: 3.3 G/DL (ref 3.4–5)
ALP SERPL-CCNC: 95 U/L (ref 46–116)
ALT SERPL W P-5'-P-CCNC: 22 U/L (ref 12–78)
APTT PPP: 28.8 SEC (ref 24.3–34.3)
AST SERPL W P-5'-P-CCNC: 24 U/L (ref 15–37)
BASOPHILS # BLD AUTO: 0.1 K/UL (ref 0–0.2)
BASOPHILS NFR BLD AUTO: 0.8 % (ref 0–2)
BILIRUB DIRECT SERPL-MCNC: 0.1 MG/DL (ref 0–0.2)
BILIRUB SERPL-MCNC: 0.3 MG/DL (ref 0.2–1)
BUN SERPL-MCNC: 21 MG/DL (ref 7–18)
CALCIUM SERPL-MCNC: 9.5 MG/DL (ref 8.5–10.1)
CHLORIDE SERPL-SCNC: 100 MMOL/L (ref 98–107)
CO2 SERPL-SCNC: 27 MMOL/L (ref 21–32)
CREAT SERPL-MCNC: 1.7 MG/DL (ref 0.6–1.3)
EOSINOPHIL # BLD AUTO: 0.1 K/UL (ref 0–0.7)
EOSINOPHIL NFR BLD AUTO: 1.4 % (ref 0–6)
ERYTHROCYTE [DISTWIDTH] IN BLOOD BY AUTOMATED COUNT: 15.6 % (ref 11.5–15)
GLUCOSE SERPL-MCNC: 124 MG/DL (ref 74–106)
HCT VFR BLD AUTO: 30 % (ref 33–45)
HGB BLD-MCNC: 9.6 G/DL (ref 11.5–14.8)
INR PPP: 1.05 (ref 0.91–1.1)
LACTATE SERPL-SCNC: 1.6 MMOL/L (ref 0.4–2)
LYMPHOCYTES NFR BLD AUTO: 2.2 K/UL (ref 0.8–4.8)
LYMPHOCYTES NFR BLD AUTO: 25.2 % (ref 20–44)
MCH RBC QN AUTO: 31 PG (ref 26–33)
MCHC RBC AUTO-ENTMCNC: 33 G/DL (ref 31–36)
MCV RBC AUTO: 95 FL (ref 82–100)
MONOCYTES NFR BLD AUTO: 0.6 K/UL (ref 0.1–1.3)
MONOCYTES NFR BLD AUTO: 6.6 % (ref 2–12)
NEUTROPHILS # BLD AUTO: 5.8 K/UL (ref 1.8–8.9)
NEUTROPHILS NFR BLD AUTO: 66 % (ref 43–81)
PLATELET # BLD AUTO: 293 K/UL (ref 150–450)
POTASSIUM SERPL-SCNC: 4.2 MMOL/L (ref 3.5–5.1)
PROT SERPL-MCNC: 8.1 G/DL (ref 6.4–8.2)
PROTHROMBIN TIME: 11.1 SECS (ref 9.2–11.1)
RBC # BLD AUTO: 3.12 MIL/UL (ref 4–5.2)
SODIUM SERPL-SCNC: 135 MMOL/L (ref 136–145)
WBC NRBC COR # BLD AUTO: 8.8 K/UL (ref 4.3–11)

## 2023-12-11 PROCEDURE — G0378 HOSPITAL OBSERVATION PER HR: HCPCS

## 2023-12-11 PROCEDURE — A4223 INFUSION SUPPLIES W/O PUMP: HCPCS

## 2023-12-11 RX ADMIN — SODIUM CHLORIDE PRN MLS/HR: 4.5 INJECTION, SOLUTION INTRAVENOUS at 15:36

## 2023-12-12 VITALS — DIASTOLIC BLOOD PRESSURE: 54 MMHG | OXYGEN SATURATION: 97 % | SYSTOLIC BLOOD PRESSURE: 149 MMHG | TEMPERATURE: 99.1 F

## 2023-12-12 VITALS — DIASTOLIC BLOOD PRESSURE: 74 MMHG | OXYGEN SATURATION: 97 % | SYSTOLIC BLOOD PRESSURE: 141 MMHG

## 2023-12-12 VITALS — OXYGEN SATURATION: 98 % | TEMPERATURE: 98.6 F | SYSTOLIC BLOOD PRESSURE: 152 MMHG | DIASTOLIC BLOOD PRESSURE: 60 MMHG

## 2023-12-12 VITALS — DIASTOLIC BLOOD PRESSURE: 87 MMHG | TEMPERATURE: 98.6 F | SYSTOLIC BLOOD PRESSURE: 135 MMHG | OXYGEN SATURATION: 98 %

## 2023-12-12 VITALS — DIASTOLIC BLOOD PRESSURE: 79 MMHG | OXYGEN SATURATION: 97 % | SYSTOLIC BLOOD PRESSURE: 142 MMHG

## 2023-12-12 VITALS — OXYGEN SATURATION: 97 % | SYSTOLIC BLOOD PRESSURE: 135 MMHG | DIASTOLIC BLOOD PRESSURE: 57 MMHG

## 2023-12-12 LAB
APPEARANCE UR: CLEAR
BASOPHILS # BLD AUTO: 0 K/UL (ref 0–0.2)
BASOPHILS NFR BLD AUTO: 0.5 % (ref 0–2)
BILIRUB UR QL STRIP: NEGATIVE
BUN SERPL-MCNC: 23 MG/DL (ref 7–18)
CALCIUM SERPL-MCNC: 8.9 MG/DL (ref 8.5–10.1)
CHLORIDE SERPL-SCNC: 98 MMOL/L (ref 98–107)
CHOLEST SERPL-MCNC: 157 MG/DL (ref ?–200)
CO2 SERPL-SCNC: 27 MMOL/L (ref 21–32)
COLOR UR: YELLOW
CREAT SERPL-MCNC: 1.7 MG/DL (ref 0.6–1.3)
CREAT UR-MCNC: 24.9 MG/DL (ref 30–125)
EOSINOPHIL # BLD AUTO: 0.3 K/UL (ref 0–0.7)
EOSINOPHIL NFR BLD AUTO: 3.3 % (ref 0–6)
ERYTHROCYTE [DISTWIDTH] IN BLOOD BY AUTOMATED COUNT: 15.2 % (ref 11.5–15)
GLUCOSE SERPL-MCNC: 94 MG/DL (ref 74–106)
GLUCOSE UR STRIP-MCNC: NEGATIVE MG/DL
HCT VFR BLD AUTO: 24 % (ref 33–45)
HDLC SERPL-MCNC: 36 MG/DL (ref 40–60)
HGB BLD-MCNC: 8 G/DL (ref 11.5–14.8)
HGB UR QL STRIP: NEGATIVE ERY/UL
IRON SERPL-MCNC: 18 UG/DL (ref 50–175)
KETONES UR STRIP-MCNC: NEGATIVE MG/DL
LDLC SERPL DIRECT ASSAY-MCNC: 95 MG/DL (ref 0–99)
LEUKOCYTE ESTERASE UR QL STRIP: NEGATIVE
LYMPHOCYTES NFR BLD AUTO: 3.2 K/UL (ref 0.8–4.8)
LYMPHOCYTES NFR BLD AUTO: 37 % (ref 20–44)
MAGNESIUM SERPL-MCNC: 2.1 MG/DL (ref 1.8–2.4)
MCH RBC QN AUTO: 31 PG (ref 26–33)
MCHC RBC AUTO-ENTMCNC: 33 G/DL (ref 31–36)
MCV RBC AUTO: 94 FL (ref 82–100)
MONOCYTES NFR BLD AUTO: 0.8 K/UL (ref 0.1–1.3)
MONOCYTES NFR BLD AUTO: 8.8 % (ref 2–12)
NEUTROPHILS # BLD AUTO: 4.4 K/UL (ref 1.8–8.9)
NEUTROPHILS NFR BLD AUTO: 50.4 % (ref 43–81)
NITRITE UR QL STRIP: NEGATIVE
PH UR STRIP: 6 [PH] (ref 5–8)
PHOSPHATE SERPL-MCNC: 3.1 MG/DL (ref 2.5–4.9)
PLATELET # BLD AUTO: 242 K/UL (ref 150–450)
POTASSIUM SERPL-SCNC: 4.1 MMOL/L (ref 3.5–5.1)
PROT UR QL STRIP: NEGATIVE MG/DL
PROT UR-MCNC: 15.8 MG/DL (ref 0–11.9)
RBC # BLD AUTO: 2.58 MIL/UL (ref 4–5.2)
SODIUM SERPL-SCNC: 132 MMOL/L (ref 136–145)
SODIUM UR-SCNC: 89 MMOL/L (ref 40–220)
SP GR UR STRIP: 1.01 (ref 1–1.03)
TIBC SERPL-MCNC: 203 UG/DL (ref 250–450)
TRIGL SERPL-MCNC: 90 MG/DL (ref 30–150)
TSH SERPL DL<=0.005 MIU/L-ACNC: 1.55 UIU/ML (ref 0.36–3.74)
UROBILINOGEN UR STRIP-MCNC: 0.2 EU/DL
WBC NRBC COR # BLD AUTO: 8.7 K/UL (ref 4.3–11)

## 2023-12-12 RX ADMIN — PANTOPRAZOLE SODIUM SCH MG: 40 TABLET, DELAYED RELEASE ORAL at 07:39

## 2023-12-12 RX ADMIN — ARIPIPRAZOLE SCH MG: 2 TABLET ORAL at 08:36

## 2023-12-12 RX ADMIN — SODIUM CHLORIDE PRN MLS/HR: 4.5 INJECTION, SOLUTION INTRAVENOUS at 04:54

## 2023-12-12 RX ADMIN — METOPROLOL SUCCINATE SCH MG: 50 TABLET, EXTENDED RELEASE ORAL at 08:37

## 2023-12-12 RX ADMIN — FLUOXETINE HYDROCHLORIDE SCH MG: 20 CAPSULE ORAL at 08:37

## 2023-12-13 VITALS — OXYGEN SATURATION: 99 % | SYSTOLIC BLOOD PRESSURE: 148 MMHG | DIASTOLIC BLOOD PRESSURE: 87 MMHG | TEMPERATURE: 97.5 F

## 2023-12-13 VITALS — DIASTOLIC BLOOD PRESSURE: 61 MMHG | SYSTOLIC BLOOD PRESSURE: 143 MMHG | TEMPERATURE: 97.8 F | OXYGEN SATURATION: 97 %

## 2023-12-13 VITALS — OXYGEN SATURATION: 98 % | SYSTOLIC BLOOD PRESSURE: 111 MMHG | DIASTOLIC BLOOD PRESSURE: 94 MMHG | TEMPERATURE: 98.7 F

## 2023-12-13 LAB
ALBUMIN SERPL BCP-MCNC: 2.8 G/DL (ref 3.4–5)
ALP SERPL-CCNC: 79 U/L (ref 46–116)
ALT SERPL W P-5'-P-CCNC: 22 U/L (ref 12–78)
AST SERPL W P-5'-P-CCNC: 23 U/L (ref 15–37)
BASOPHILS # BLD AUTO: 0 K/UL (ref 0–0.2)
BASOPHILS NFR BLD AUTO: 0.4 % (ref 0–2)
BILIRUB DIRECT SERPL-MCNC: 0.1 MG/DL (ref 0–0.2)
BILIRUB SERPL-MCNC: 0.2 MG/DL (ref 0.2–1)
BUN SERPL-MCNC: 24 MG/DL (ref 7–18)
CALCIUM SERPL-MCNC: 9.2 MG/DL (ref 8.5–10.1)
CHLORIDE SERPL-SCNC: 100 MMOL/L (ref 98–107)
CK SERPL-CCNC: 77 U/L (ref 26–192)
CO2 SERPL-SCNC: 28 MMOL/L (ref 21–32)
CREAT SERPL-MCNC: 1.4 MG/DL (ref 0.6–1.3)
EOSINOPHIL # BLD AUTO: 0.2 K/UL (ref 0–0.7)
EOSINOPHIL NFR BLD AUTO: 2.2 % (ref 0–6)
ERYTHROCYTE [DISTWIDTH] IN BLOOD BY AUTOMATED COUNT: 15.1 % (ref 11.5–15)
GLUCOSE SERPL-MCNC: 100 MG/DL (ref 74–106)
HCT VFR BLD AUTO: 26 % (ref 33–45)
HGB BLD-MCNC: 8.8 G/DL (ref 11.5–14.8)
LYMPHOCYTES NFR BLD AUTO: 2 K/UL (ref 0.8–4.8)
LYMPHOCYTES NFR BLD AUTO: 27.8 % (ref 20–44)
MAGNESIUM SERPL-MCNC: 2.5 MG/DL (ref 1.8–2.4)
MCH RBC QN AUTO: 31 PG (ref 26–33)
MCHC RBC AUTO-ENTMCNC: 33 G/DL (ref 31–36)
MCV RBC AUTO: 94 FL (ref 82–100)
MONOCYTES NFR BLD AUTO: 0.6 K/UL (ref 0.1–1.3)
MONOCYTES NFR BLD AUTO: 8.5 % (ref 2–12)
NEUTROPHILS # BLD AUTO: 4.5 K/UL (ref 1.8–8.9)
NEUTROPHILS NFR BLD AUTO: 61.1 % (ref 43–81)
PHOSPHATE SERPL-MCNC: 2.8 MG/DL (ref 2.5–4.9)
PLATELET # BLD AUTO: 283 K/UL (ref 150–450)
POTASSIUM SERPL-SCNC: 4.1 MMOL/L (ref 3.5–5.1)
PROT SERPL-MCNC: 7.1 G/DL (ref 6.4–8.2)
RBC # BLD AUTO: 2.81 MIL/UL (ref 4–5.2)
SODIUM SERPL-SCNC: 135 MMOL/L (ref 136–145)
WBC NRBC COR # BLD AUTO: 7.4 K/UL (ref 4.3–11)

## 2023-12-13 RX ADMIN — FLUOXETINE HYDROCHLORIDE SCH MG: 20 CAPSULE ORAL at 08:36

## 2023-12-13 RX ADMIN — PANTOPRAZOLE SODIUM SCH MG: 40 TABLET, DELAYED RELEASE ORAL at 08:36

## 2023-12-13 RX ADMIN — SODIUM CHLORIDE SCH MLS/HR: 9 INJECTION, SOLUTION INTRAVENOUS at 15:15

## 2023-12-13 RX ADMIN — ARIPIPRAZOLE SCH MG: 2 TABLET ORAL at 08:37

## 2023-12-13 RX ADMIN — METOPROLOL SUCCINATE SCH MG: 50 TABLET, EXTENDED RELEASE ORAL at 08:42

## 2023-12-14 VITALS — TEMPERATURE: 98.1 F | OXYGEN SATURATION: 98 % | SYSTOLIC BLOOD PRESSURE: 150 MMHG | DIASTOLIC BLOOD PRESSURE: 60 MMHG

## 2023-12-14 VITALS — DIASTOLIC BLOOD PRESSURE: 60 MMHG | SYSTOLIC BLOOD PRESSURE: 150 MMHG

## 2023-12-14 LAB
ALBUMIN SERPL ELPH-MCNC: 3 G/DL (ref 2.9–4.4)
ALBUMIN/GLOB SERPL: 0.8 {RATIO} (ref 0.7–1.7)
ALPHA1 GLOB SERPL ELPH-MCNC: 0.3 G/DL (ref 0–0.4)
ALPHA2 GLOB SERPL ELPH-MCNC: 1 G/DL (ref 0.4–1)
B-GLOBULIN SERPL ELPH-MCNC: 1.1 G/DL (ref 0.7–1.3)
BUN SERPL-MCNC: 31 MG/DL (ref 7–18)
CALCIUM SERPL-MCNC: 9.6 MG/DL (ref 8.5–10.1)
CHLORIDE SERPL-SCNC: 100 MMOL/L (ref 98–107)
CO2 SERPL-SCNC: 28 MMOL/L (ref 21–32)
CREAT SERPL-MCNC: 1.9 MG/DL (ref 0.6–1.3)
GAMMA GLOB SERPL ELPH-MCNC: 1.3 G/DL (ref 0.4–1.8)
GLOBULIN SER CALC-MCNC: 3.7 G/DL (ref 2.2–3.9)
GLUCOSE SERPL-MCNC: 92 MG/DL (ref 74–106)
POTASSIUM SERPL-SCNC: 3.8 MMOL/L (ref 3.5–5.1)
PROT SERPL-MCNC: 6.7 G/DL (ref 6–8.5)
PTH-INTACT SERPL-MCNC: 48 PG/ML (ref 15–65)
SODIUM SERPL-SCNC: 135 MMOL/L (ref 136–145)

## 2023-12-14 RX ADMIN — METOPROLOL SUCCINATE SCH MG: 50 TABLET, EXTENDED RELEASE ORAL at 08:02

## 2023-12-14 RX ADMIN — SODIUM CHLORIDE SCH MLS/HR: 9 INJECTION, SOLUTION INTRAVENOUS at 13:05

## 2023-12-14 RX ADMIN — PANTOPRAZOLE SODIUM SCH MG: 40 TABLET, DELAYED RELEASE ORAL at 08:02

## 2023-12-14 RX ADMIN — FLUOXETINE HYDROCHLORIDE SCH MG: 20 CAPSULE ORAL at 08:02

## 2023-12-14 RX ADMIN — ARIPIPRAZOLE SCH MG: 2 TABLET ORAL at 08:02
